# Patient Record
Sex: FEMALE | URBAN - METROPOLITAN AREA
[De-identification: names, ages, dates, MRNs, and addresses within clinical notes are randomized per-mention and may not be internally consistent; named-entity substitution may affect disease eponyms.]

---

## 2022-08-25 ENCOUNTER — APPOINTMENT (OUTPATIENT)
Dept: URBAN - METROPOLITAN AREA CLINIC 252 | Age: 72
Setting detail: DERMATOLOGY
End: 2022-08-26

## 2022-08-25 VITALS — HEIGHT: 63 IN | WEIGHT: 207 LBS | RESPIRATION RATE: 18 BRPM

## 2022-08-25 DIAGNOSIS — L65.9 NONSCARRING HAIR LOSS, UNSPECIFIED: ICD-10-CM

## 2022-08-25 PROCEDURE — OTHER ADDITIONAL NOTES: OTHER

## 2022-08-25 PROCEDURE — OTHER COUNSELING: OTHER

## 2022-08-25 PROCEDURE — 99202 OFFICE O/P NEW SF 15 MIN: CPT

## 2022-08-25 ASSESSMENT — LOCATION SIMPLE DESCRIPTION DERM: LOCATION SIMPLE: ANTERIOR SCALP

## 2022-08-25 ASSESSMENT — LOCATION DETAILED DESCRIPTION DERM: LOCATION DETAILED: MID-FRONTAL SCALP

## 2022-08-25 ASSESSMENT — LOCATION ZONE DERM: LOCATION ZONE: SCALP

## 2022-08-25 NOTE — PROCEDURE: ADDITIONAL NOTES
Detail Level: Detailed
Additional Notes: Recommended OTC minoxidil topical as directed
Render Risk Assessment In Note?: no

## 2022-08-25 NOTE — HPI: HAIR LOSS
Additional History: Took oral terbinafine for 3 months and last took from March to mid June. Hair loss started 1 month after starting. She reports continuing to loose hair. No infections or hospitalization. Recent diagnosis with type 2 diabetes. Has lost 61 pounds since diagnosis of dm2 via diet changes. Had not cut out any food groups. She denies autoimmunity or family history.